# Patient Record
Sex: MALE | Race: WHITE | NOT HISPANIC OR LATINO | URBAN - NONMETROPOLITAN AREA
[De-identification: names, ages, dates, MRNs, and addresses within clinical notes are randomized per-mention and may not be internally consistent; named-entity substitution may affect disease eponyms.]

---

## 2018-05-22 ENCOUNTER — OFFICE VISIT (OUTPATIENT)
Dept: UROLOGY | Facility: CLINIC | Age: 38
End: 2018-05-22

## 2018-05-22 VITALS — HEIGHT: 69 IN | BODY MASS INDEX: 22.22 KG/M2 | WEIGHT: 150 LBS

## 2018-05-22 DIAGNOSIS — N48.0 BXO (BALANITIS XEROTICA OBLITERANS): Primary | ICD-10-CM

## 2018-05-22 PROCEDURE — 99202 OFFICE O/P NEW SF 15 MIN: CPT | Performed by: UROLOGY

## 2018-05-22 PROCEDURE — 53600 DILATE URETHRA STRICTURE: CPT | Performed by: UROLOGY

## 2018-05-22 PROCEDURE — 99406 BEHAV CHNG SMOKING 3-10 MIN: CPT | Performed by: UROLOGY

## 2018-05-22 RX ORDER — FLUOCINONIDE GEL 0.5 MG/G
GEL TOPICAL DAILY
Qty: 15 G | Refills: 2 | Status: SHIPPED | OUTPATIENT
Start: 2018-05-22

## 2018-05-22 NOTE — PROGRESS NOTES
Chief Complaint:          Chief Complaint   Patient presents with   • Meatal Stenosis       HPI:   37 y.o. male.  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  An irritation from intercourse.  He has no family history of the aforementioned problem and no prior 68 urologic complaints he has significant difficulty voiding after discussion I'm undergo ahead and dilated right here in the office.    Past Medical History:      History reviewed. No pertinent past medical history.      Current Meds:     No current outpatient prescriptions on file.     No current facility-administered medications for this visit.         Allergies:      No Known Allergies     Past Surgical History:     History reviewed. No pertinent surgical history.      Social History:     Social History     Social History   • Marital status: Unknown     Spouse name: N/A   • Number of children: N/A   • Years of education: N/A     Occupational History   • Not on file.     Social History Main Topics   • Smoking status: Current Every Day Smoker   • Smokeless tobacco: Never Used   • Alcohol use No   • Drug use: No   • Sexual activity: Not on file     Other Topics Concern   • Not on file     Social History Narrative   • No narrative on file       Family History:     Family History   Problem Relation Age of Onset   • No Known Problems Father    • No Known Problems Mother        Review of Systems:     Review of Systems   Constitutional: Negative.    HENT: Negative.    Eyes: Negative.    Respiratory: Negative.    Cardiovascular: Negative.    Gastrointestinal: Negative.    Endocrine: Negative.    Genitourinary: Positive for difficulty urinating.   Musculoskeletal: Negative.    Allergic/Immunologic: Negative.    Neurological: Negative.     Hematological: Negative.    Psychiatric/Behavioral: Negative.        Physical Exam:     Physical Exam   Constitutional: He is oriented to person, place, and time. He appears well-developed and well-nourished.   HENT:   Head: Normocephalic and atraumatic.   Eyes: Conjunctivae and EOM are normal. Pupils are equal, round, and reactive to light.   Neck: Normal range of motion.   Cardiovascular: Normal rate, regular rhythm, normal heart sounds and intact distal pulses.    Pulmonary/Chest: Effort normal and breath sounds normal.   Abdominal: Soft. Bowel sounds are normal.   Genitourinary:   Genitourinary Comments: Circumcised phallus with balanitis xerotica obliterans and significant meatal stenosis   Musculoskeletal: Normal range of motion.   Neurological: He is alert and oriented to person, place, and time. He has normal reflexes.   Skin: Skin is warm and dry.   Psychiatric: He has a normal mood and affect. His behavior is normal. Judgment and thought content normal.   Nursing note and vitals reviewed.      I have reviewed the following portions of the patient's history: allergies, current medications, past family history, past medical history, past social history, past surgical history, problem list and ROS and confirm it's accurate.      Procedure:   After an appropriate informed consent the patient is brought to the operative suite prep and drape in a sterile fashion in a normal circumcised phallus with obvious balanitis Xerotica obliterans I anesthetized with 22 cc of 2% viscous Xylocaine jelly and after an adequate period of topical anesthesia I used the [employer] meatal dilator to dilate to 1 cm without complication I gave him Lidex ointment to continue self dilatation twice a day and I'll see him back in 2 weeks    Assessment/Plan:   Balanitis Xerotica obliterans-status post dilation     Patient's Body mass index is 22.15 kg/m². BMI is within normal parameters. No follow-up required.    I advised the patient of the  risks in continuing to use tobacco, and I provided this patient with smoking cessation educational materials.    During this visit, I spent *3-10** minutes counseling the patient regarding smoking cessation.        This document has been electronically signed by LIZZY TOBAR MD May 22, 2018 11:33 AM

## 2018-06-05 ENCOUNTER — OFFICE VISIT (OUTPATIENT)
Dept: UROLOGY | Facility: CLINIC | Age: 38
End: 2018-06-05

## 2018-06-05 VITALS — BODY MASS INDEX: 22.22 KG/M2 | HEIGHT: 69 IN | WEIGHT: 150 LBS

## 2018-06-05 DIAGNOSIS — N48.0 BXO (BALANITIS XEROTICA OBLITERANS): Primary | ICD-10-CM

## 2018-06-05 PROCEDURE — 99213 OFFICE O/P EST LOW 20 MIN: CPT | Performed by: UROLOGY

## 2018-06-05 NOTE — PROGRESS NOTES
Chief Complaint:          Chief Complaint   Patient presents with   • Balanitis       HPI:   37 y.o. male.  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  An irritation from intercourse.  He has no family history of the aforementioned problem and no prior 68 urologic complaints he has significant difficulty voiding after discussion I'm undergo ahead and dilated right here in the office.  He returns today.  He has been using topical Lidex and self dilating the meatus he has a good stream and no other complaints or problems.  Reviewed the various options available to him including reconstruction of the meatus or simply self dilation which she's comfortable doing.  I explained to him the pathophysiology of his particular condition.  I will see him back on an as-needed basis.    Past Medical History:      History reviewed. No pertinent past medical history.      Current Meds:     Current Outpatient Prescriptions   Medication Sig Dispense Refill   • fluocinonide (LIDEX) 0.05 % gel Apply  topically Daily. 15 g 2     No current facility-administered medications for this visit.         Allergies:      No Known Allergies     Past Surgical History:     No past surgical history on file.      Social History:     Social History     Social History   • Marital status: Unknown     Spouse name: N/A   • Number of children: N/A   • Years of education: N/A     Occupational History   • Not on file.     Social History Main Topics   • Smoking status: Current Every Day Smoker   • Smokeless tobacco: Never Used   • Alcohol use No   • Drug use: No   • Sexual activity: Not on file     Other Topics Concern   • Not on file     Social History Narrative   • No narrative on file       Family History:     Family History   Problem  Relation Age of Onset   • No Known Problems Father    • No Known Problems Mother        Review of Systems:     Review of Systems   Constitutional: Negative.    HENT: Negative.    Eyes: Negative.    Respiratory: Negative.    Cardiovascular: Negative.    Gastrointestinal: Negative.    Endocrine: Negative.    Musculoskeletal: Negative.    Allergic/Immunologic: Negative.    Neurological: Negative.    Hematological: Negative.    Psychiatric/Behavioral: Negative.        Physical Exam:     Physical Exam   Constitutional: He is oriented to person, place, and time. He appears well-developed and well-nourished.   HENT:   Head: Normocephalic and atraumatic.   Eyes: Conjunctivae and EOM are normal. Pupils are equal, round, and reactive to light.   Neck: Normal range of motion.   Cardiovascular: Normal rate, regular rhythm, normal heart sounds and intact distal pulses.    Pulmonary/Chest: Effort normal and breath sounds normal.   Abdominal: Soft. Bowel sounds are normal.   Genitourinary: Penis normal.   Genitourinary Comments: Meatal BXO   Musculoskeletal: Normal range of motion.   Neurological: He is alert and oriented to person, place, and time. He has normal reflexes.   Skin: Skin is warm and dry.   Psychiatric: He has a normal mood and affect. His behavior is normal. Judgment and thought content normal.   Nursing note and vitals reviewed.      I have reviewed the following portions of the patient's history: allergies, current medications, past family history, past medical history, past social history, past surgical history, problem list and ROS and confirm it's accurate.      Procedure:       Assessment/Plan:   Meatal stenosis secondary to BXO will continue self dilation and topical Lidex ointment     Patient's Body mass index is 22.15 kg/m². BMI is within normal parameters. No follow-up required.    I advised the patient of the risks in continuing to use tobacco, and I provided this patient with smoking cessation  educational materials.    During this visit, I spent 3-10 minutes counseling the patient regarding smoking cessation.        This document has been electronically signed by LIZZY TOBAR MD June 5, 2018 10:16 AM